# Patient Record
Sex: MALE | Race: WHITE | NOT HISPANIC OR LATINO | URBAN - METROPOLITAN AREA
[De-identification: names, ages, dates, MRNs, and addresses within clinical notes are randomized per-mention and may not be internally consistent; named-entity substitution may affect disease eponyms.]

---

## 2017-08-22 ENCOUNTER — OUTPATIENT (OUTPATIENT)
Dept: OUTPATIENT SERVICES | Facility: HOSPITAL | Age: 77
LOS: 1 days | Discharge: HOME | End: 2017-08-22

## 2017-08-24 DIAGNOSIS — H35.351 CYSTOID MACULAR DEGENERATION, RIGHT EYE: ICD-10-CM

## 2017-08-24 DIAGNOSIS — H31.093 OTHER CHORIORETINAL SCARS, BILATERAL: ICD-10-CM

## 2017-08-24 DIAGNOSIS — H02.831 DERMATOCHALASIS OF RIGHT UPPER EYELID: ICD-10-CM

## 2017-08-24 DIAGNOSIS — H16.223 KERATOCONJUNCTIVITIS SICCA, NOT SPECIFIED AS SJOGREN'S, BILATERAL: ICD-10-CM

## 2017-08-24 DIAGNOSIS — H02.834 DERMATOCHALASIS OF LEFT UPPER EYELID: ICD-10-CM

## 2017-08-24 PROBLEM — Z00.00 ENCOUNTER FOR PREVENTIVE HEALTH EXAMINATION: Status: ACTIVE | Noted: 2017-08-24

## 2018-04-03 ENCOUNTER — OUTPATIENT (OUTPATIENT)
Dept: OUTPATIENT SERVICES | Facility: HOSPITAL | Age: 78
LOS: 1 days | Discharge: HOME | End: 2018-04-03

## 2018-04-17 DIAGNOSIS — T81.31XD DISRUPTION OF EXTERNAL OPERATION (SURGICAL) WOUND, NOT ELSEWHERE CLASSIFIED, SUBSEQUENT ENCOUNTER: ICD-10-CM

## 2018-04-17 DIAGNOSIS — H02.832 DERMATOCHALASIS OF RIGHT LOWER EYELID: ICD-10-CM

## 2018-04-17 DIAGNOSIS — H16.223 KERATOCONJUNCTIVITIS SICCA, NOT SPECIFIED AS SJOGREN'S, BILATERAL: ICD-10-CM

## 2018-04-17 DIAGNOSIS — D31.42 BENIGN NEOPLASM OF LEFT CILIARY BODY: ICD-10-CM

## 2018-04-17 DIAGNOSIS — H35.3131 NONEXUDATIVE AGE-RELATED MACULAR DEGENERATION, BILATERAL, EARLY DRY STAGE: ICD-10-CM

## 2018-04-17 DIAGNOSIS — L85.8 OTHER SPECIFIED EPIDERMAL THICKENING: ICD-10-CM

## 2018-04-17 DIAGNOSIS — H02.835 DERMATOCHALASIS OF LEFT LOWER EYELID: ICD-10-CM

## 2019-05-23 ENCOUNTER — OUTPATIENT (OUTPATIENT)
Dept: OUTPATIENT SERVICES | Facility: HOSPITAL | Age: 79
LOS: 1 days | Discharge: HOME | End: 2019-05-23
Payer: MEDICARE

## 2019-05-23 DIAGNOSIS — R07.9 CHEST PAIN, UNSPECIFIED: ICD-10-CM

## 2019-05-23 PROCEDURE — 75574 CT ANGIO HRT W/3D IMAGE: CPT | Mod: 26

## 2019-06-26 ENCOUNTER — OUTPATIENT (OUTPATIENT)
Dept: OUTPATIENT SERVICES | Facility: HOSPITAL | Age: 79
LOS: 1 days | Discharge: HOME | End: 2019-06-26

## 2019-06-26 LAB
HCT VFR BLD CALC: 47.8 % — SIGNIFICANT CHANGE UP (ref 42–52)
HGB BLD-MCNC: 16.2 G/DL — SIGNIFICANT CHANGE UP (ref 14–18)
MCHC RBC-ENTMCNC: 31.8 PG — HIGH (ref 27–31)
MCHC RBC-ENTMCNC: 33.9 G/DL — SIGNIFICANT CHANGE UP (ref 32–37)
MCV RBC AUTO: 93.7 FL — SIGNIFICANT CHANGE UP (ref 80–94)
NRBC # BLD: 0 /100 WBCS — SIGNIFICANT CHANGE UP (ref 0–0)
PLATELET # BLD AUTO: 160 K/UL — SIGNIFICANT CHANGE UP (ref 130–400)
RBC # BLD: 5.1 M/UL — SIGNIFICANT CHANGE UP (ref 4.7–6.1)
RBC # FLD: 13.2 % — SIGNIFICANT CHANGE UP (ref 11.5–14.5)
WBC # BLD: 6.57 K/UL — SIGNIFICANT CHANGE UP (ref 4.8–10.8)
WBC # FLD AUTO: 6.57 K/UL — SIGNIFICANT CHANGE UP (ref 4.8–10.8)

## 2019-06-26 RX ORDER — ASPIRIN/CALCIUM CARB/MAGNESIUM 324 MG
1 TABLET ORAL
Qty: 30 | Refills: 0
Start: 2019-06-26 | End: 2019-07-25

## 2019-06-26 RX ORDER — FINASTERIDE 5 MG/1
1 TABLET, FILM COATED ORAL
Qty: 0 | Refills: 0 | DISCHARGE

## 2019-06-26 RX ORDER — METOPROLOL TARTRATE 50 MG
1 TABLET ORAL
Qty: 30 | Refills: 0
Start: 2019-06-26 | End: 2019-07-25

## 2019-06-26 RX ORDER — CLOPIDOGREL BISULFATE 75 MG/1
1 TABLET, FILM COATED ORAL
Qty: 30 | Refills: 0
Start: 2019-06-26 | End: 2019-07-25

## 2019-06-26 RX ORDER — METOPROLOL TARTRATE 50 MG
1 TABLET ORAL
Qty: 0 | Refills: 0 | DISCHARGE

## 2019-06-26 RX ORDER — DUTASTERIDE 0.5 MG/1
1 CAPSULE, LIQUID FILLED ORAL
Qty: 30 | Refills: 0
Start: 2019-06-26 | End: 2019-07-25

## 2019-06-26 NOTE — PROGRESS NOTE ADULT - SUBJECTIVE AND OBJECTIVE BOX
Cardiology Follow up    GROVER BEJARANO   79y Male  PAST MEDICAL & SURGICAL HISTORY:  Hyperlipidemia  GERD (gastroesophageal reflux disease)     HPI:  79 yrs old with Hx of BPH presented for elective LHC.  pt had a CCTA as out pt showing stenosis in mid LAD. (26 Jun 2019 10:11)    Allergies    penicillin (Rash)    Patient without complaints. Pt ambulated without issues/symptoms  Denies CP, SOB, palpitations, or dizziness    HR: 63  BP: 130/67	  RR: 14  SpO2: 98 % on RA    REVIEW OF SYSTEMS:          CONSTITUTIONAL: No weakness, fevers or chills          EYES/ENT: No visual changes;  No vertigo or throat pain           NECK: No pain or stiffness          RESPIRATORY: No cough, wheezing, hemoptysis          CARDIOVASCULAR: no pain, no AMADOR, no palpitations           GASTROINTESTINAL: No abdominal or epigastric pain. No nausea, vomiting, or hematemesis;           GENITOURINARY: No dysuria, frequency or hematuria          NEUROLOGICAL: No numbness or weakness          SKIN: No itching, rashes    PHYSICAL EXAM:           CONSTITUTIONAL: Well-developed; well-nourished; in no acute distress  	SKIN: warm, dry  	HEAD: Normocephalic; atraumatic  	EYES: PERRL.  	ENT: No nasal discharge, airway clear, mucous membranes moist  	NECK: Supple; non tender.  	CARD: +S1, +S2, no murmurs, gallops, or rubs. Regular rate and rhythm    	RESP: No wheezes, rales or rhonchi. CTA B/L  	ABD: soft ntnd, + BS x 4 quadrants  	EXT: moves all extremities,  no clubbing, cyanosis or edema  	NEURO: Alert and oriented x3, no focal deficits          PSYCH: Cooperative, appropriate          VASCULAR:  +2 Rad / +2PTs / + 2DPs          EXTREMITY:              Right Radial: Dressing D/C/I, access site soft, no hematoma, no pain, + pulses, no numbness            · Note Type	Event Note  brief cath report	      PRE-OP DIAGNOSIS: abnormal CCTA    PROCEDURE: [facility] with coronary angiography    Physician: Dr Davidson/Dr Almaraz  Assistant: Rito    ANESTHESIA TYPE:  [  ]General Anesthesia  [ x ] Sedation  [  ] Local/Regional    ESTIMATED BLOOD LOSS:    10   mL    CONDITION  [  ] Critical  [  ] Serious  [  ]Fair  [x  ]Good      IV CONTRAST:  150     mL      FINDINGS    Left Heart Catheterization:  LVEF%: 55%  LVEDP:  [ ] Normal Coronary Arteries  [ ] Luminal Irregularities  [ ] Non-obstructive CAD    ACCESS:    [x ] right radial artery  [ ] right femoral artery    LEFT HEART CATHETERIZATION                                    Left main no disease  LAD:  100% chronic total occlusion in mid LAD , with bridging collaterals                      Left Circumflex: 50% lesion in distal Circ  Right Coronary Artery: 30% lesion in mid RCA      POST-OP DIAGNOSIS  CAD , with  in mid LAD.      PLAN OF CARE  [x ] D/C Home today, pt will be brought back for the mid LAD  in 2 to 4 weeks with femoral access              pt was loaded with 300mg of plavix , continue with asa 81mg and plavix 75mg daily in addition to statin and BB.      Electronic Signatures:  Shantelle Veras)  (Signed 26-Jun-2019 12:08)  	Authored: Note Type, Note  Eugene Botello)  (Signature Pending)  	Co-Signer: Note Type, Note    ECG:   NSR       LABS:                        16.2   6.57  )-----------( 160      ( 26 Jun 2019 09:45 )             47.8     A/P:  I discussed the case with Cardiologist Dr. Botello and recommend the following:    S/P PCI:   	     Start DAPT ( Aspirin 81 mg PO Daily, Plavix 75 mg PO daily ), B-Blocker, Statin Therapy                   Patient given 30 day supply of ( Aspirin 81 mg daily and Plavix 75 mg daily ) to take at home                   Patient agreeing to take DAPT for at least one year or as directed by cardiologist                    Pt given instructions on importance of taking antiplatelet medication or risk acute stent thrombosis/death                   Post cath instructions, access site care and activity restrictions reviewed with patient                     Discussed with patient to return to hospital if experience chest pain, shortness breath, dizziness and site bleeding                   Aggressive risk factor modification, diet counseling, smoking cessation discussed with patient                       Can discharge patient from cardiac standpoint after ambulating without symptoms and access site wnl                    Follow up with Cardiology Dr. Botello in one week.  Instructed to call and make an appointment Cardiology Follow up    GROVER BEJARANO   79y Male  PAST MEDICAL & SURGICAL HISTORY:  Hyperlipidemia  GERD (gastroesophageal reflux disease)     HPI:  79 yrs old with Hx of BPH presented for elective LHC.  pt had a CCTA as out pt showing stenosis in mid LAD. (26 Jun 2019 10:11)    Allergies    penicillin (Rash)    Patient without complaints. Pt ambulated without issues/symptoms  Denies CP, SOB, palpitations, or dizziness    HR: 63  BP: 130/67	  RR: 14  SpO2: 98 % on RA    REVIEW OF SYSTEMS:          CONSTITUTIONAL: No weakness, fevers or chills          EYES/ENT: No visual changes;  No vertigo or throat pain           NECK: No pain or stiffness          RESPIRATORY: No cough, wheezing, hemoptysis          CARDIOVASCULAR: no pain, no AMADOR, no palpitations           GASTROINTESTINAL: No abdominal or epigastric pain. No nausea, vomiting, or hematemesis;           GENITOURINARY: No dysuria, frequency or hematuria          NEUROLOGICAL: No numbness or weakness          SKIN: No itching, rashes    PHYSICAL EXAM:           CONSTITUTIONAL: Well-developed; well-nourished; in no acute distress  	SKIN: warm, dry  	HEAD: Normocephalic; atraumatic  	EYES: PERRL.  	ENT: No nasal discharge, airway clear, mucous membranes moist  	NECK: Supple; non tender.  	CARD: +S1, +S2, no murmurs, gallops, or rubs. Regular rate and rhythm    	RESP: No wheezes, rales or rhonchi. CTA B/L  	ABD: soft ntnd, + BS x 4 quadrants  	EXT: moves all extremities,  no clubbing, cyanosis or edema  	NEURO: Alert and oriented x3, no focal deficits          PSYCH: Cooperative, appropriate          VASCULAR:  +2 Rad / +2PTs / + 2DPs          EXTREMITY:              Right Radial: Dressing D/C/I, access site soft, no hematoma, no pain, + pulses, no numbness            · Note Type	Event Note  brief cath report	      PRE-OP DIAGNOSIS: abnormal CCTA    PROCEDURE: [facility] with coronary angiography    Physician: Dr Davidson/Dr Almaraz  Assistant: Rito    ANESTHESIA TYPE:  [  ]General Anesthesia  [ x ] Sedation  [  ] Local/Regional    ESTIMATED BLOOD LOSS:    10   mL    CONDITION  [  ] Critical  [  ] Serious  [  ]Fair  [x  ]Good      IV CONTRAST:  150     mL      FINDINGS    Left Heart Catheterization:  LVEF%: 55%  LVEDP:  [ ] Normal Coronary Arteries  [ ] Luminal Irregularities  [ ] Non-obstructive CAD    ACCESS:    [x ] right radial artery  [ ] right femoral artery    LEFT HEART CATHETERIZATION                                    Left main no disease  LAD:  100% chronic total occlusion in mid LAD , with bridging collaterals                      Left Circumflex: 50% lesion in distal Circ  Right Coronary Artery: 30% lesion in mid RCA      POST-OP DIAGNOSIS  CAD , with  in mid LAD.      PLAN OF CARE  [x ] D/C Home today, pt will be brought back for the mid LAD  in 2 to 4 weeks with femoral access              pt was loaded with 300mg of plavix , continue with asa 81mg and plavix 75mg daily in addition to statin and BB.      Electronic Signatures:  Shantelle Veras)  (Signed 26-Jun-2019 12:08)  	Authored: Note Type, Note  Eugene Botello)  (Signature Pending)  	Co-Signer: Note Type, Note    ECG:   NSR       LABS:                        16.2   6.57  )-----------( 160      ( 26 Jun 2019 09:45 )             47.8     A/P:  I discussed the case with Cardiologist Dr. Botello and recommend the following:     s/p Cath: mid LAD     	     Start DAPT ( Aspirin 81 mg PO Daily, Plavix 75 mg PO daily ), B-Blocker, Statin Therapy                   Patient given 30 day supply of ( Aspirin 81 mg daily and Plavix 75 mg daily ) to take at home                   Patient agreeing to take DAPT for at least one year or as directed by cardiologist                    Pt given instructions on importance of taking antiplatelet medication or risk acute stent thrombosis/death                   Post cath instructions, access site care and activity restrictions reviewed with patient                     Discussed with patient to return to hospital if experience chest pain, shortness breath, dizziness and site bleeding                   Aggressive risk factor modification, diet counseling, smoking cessation discussed with patient                       Can discharge patient from cardiac standpoint after ambulating without symptoms and access site wnl                    Follow up with Cardiology Dr. Botello in one week.  Instructed to call and make an appointment

## 2019-06-26 NOTE — CHART NOTE - NSCHARTNOTEFT_GEN_A_CORE
PRE-OP DIAGNOSIS: abnormal CCTA    PROCEDURE: [facility] with coronary angiography    Physician: Dr Davidson/Dr Almaraz  Assistant: Rito    ANESTHESIA TYPE:  [  ]General Anesthesia  [ x ] Sedation  [  ] Local/Regional    ESTIMATED BLOOD LOSS:    10   mL    CONDITION  [  ] Critical  [  ] Serious  [  ]Fair  [x  ]Good      IV CONTRAST:  150     mL      FINDINGS    Left Heart Catheterization:  LVEF%: 55%  LVEDP:  [ ] Normal Coronary Arteries  [ ] Luminal Irregularities  [ ] Non-obstructive CAD    ACCESS:    [x ] right radial artery  [ ] right femoral artery    LEFT HEART CATHETERIZATION                                    Left main no disease  LAD:  100% chronic total occlusion in mid LAD , with bridging collaterals                      Left Circumflex: 50% lesion in distal Circ  Right Coronary Artery: 30% lesion in mid RCA      POST-OP DIAGNOSIS  CAD , with  in mid LAD.      PLAN OF CARE  [x ] D/C Home today, pt will be brought back for the mid LAD  in 2 to 4 weeks with femoral access              pt was loaded with 300mg of plavix , continue with asa 81mg nad plavix 75mg daily in addition to statin and BB

## 2019-06-26 NOTE — H&P CARDIOLOGY - HISTORY OF PRESENT ILLNESS
79 yrs old with Hx of BPH presented for elective LHC.  pt had a CCTA as out pt showing stenosis in mid LAD.

## 2019-07-05 DIAGNOSIS — K21.9 GASTRO-ESOPHAGEAL REFLUX DISEASE WITHOUT ESOPHAGITIS: ICD-10-CM

## 2019-07-05 DIAGNOSIS — E78.00 PURE HYPERCHOLESTEROLEMIA, UNSPECIFIED: ICD-10-CM

## 2019-07-05 DIAGNOSIS — I25.118 ATHEROSCLEROTIC HEART DISEASE OF NATIVE CORONARY ARTERY WITH OTHER FORMS OF ANGINA PECTORIS: ICD-10-CM

## 2019-07-05 DIAGNOSIS — Z88.0 ALLERGY STATUS TO PENICILLIN: ICD-10-CM

## 2019-07-05 DIAGNOSIS — I20.8 OTHER FORMS OF ANGINA PECTORIS: ICD-10-CM

## 2019-07-16 PROBLEM — K21.9 GASTRO-ESOPHAGEAL REFLUX DISEASE WITHOUT ESOPHAGITIS: Chronic | Status: ACTIVE | Noted: 2019-06-26

## 2019-07-16 PROBLEM — E78.5 HYPERLIPIDEMIA, UNSPECIFIED: Chronic | Status: ACTIVE | Noted: 2019-06-26

## 2019-07-25 ENCOUNTER — OUTPATIENT (OUTPATIENT)
Dept: OUTPATIENT SERVICES | Facility: HOSPITAL | Age: 79
LOS: 1 days | Discharge: HOME | End: 2019-07-25
Payer: MEDICARE

## 2019-07-25 LAB
HCT VFR BLD CALC: 44.9 % — SIGNIFICANT CHANGE UP (ref 42–52)
HCT VFR BLD CALC: 45.7 % — SIGNIFICANT CHANGE UP (ref 42–52)
HGB BLD-MCNC: 15.1 G/DL — SIGNIFICANT CHANGE UP (ref 14–18)
HGB BLD-MCNC: 15.3 G/DL — SIGNIFICANT CHANGE UP (ref 14–18)
MCHC RBC-ENTMCNC: 31 PG — SIGNIFICANT CHANGE UP (ref 27–31)
MCHC RBC-ENTMCNC: 31.5 PG — HIGH (ref 27–31)
MCHC RBC-ENTMCNC: 33.5 G/DL — SIGNIFICANT CHANGE UP (ref 32–37)
MCHC RBC-ENTMCNC: 33.6 G/DL — SIGNIFICANT CHANGE UP (ref 32–37)
MCV RBC AUTO: 92.7 FL — SIGNIFICANT CHANGE UP (ref 80–94)
MCV RBC AUTO: 93.5 FL — SIGNIFICANT CHANGE UP (ref 80–94)
NRBC # BLD: 0 /100 WBCS — SIGNIFICANT CHANGE UP (ref 0–0)
NRBC # BLD: 0 /100 WBCS — SIGNIFICANT CHANGE UP (ref 0–0)
PLATELET # BLD AUTO: 164 K/UL — SIGNIFICANT CHANGE UP (ref 130–400)
PLATELET # BLD AUTO: 168 K/UL — SIGNIFICANT CHANGE UP (ref 130–400)
RBC # BLD: 4.8 M/UL — SIGNIFICANT CHANGE UP (ref 4.7–6.1)
RBC # BLD: 4.93 M/UL — SIGNIFICANT CHANGE UP (ref 4.7–6.1)
RBC # FLD: 12.9 % — SIGNIFICANT CHANGE UP (ref 11.5–14.5)
RBC # FLD: 13 % — SIGNIFICANT CHANGE UP (ref 11.5–14.5)
WBC # BLD: 5.8 K/UL — SIGNIFICANT CHANGE UP (ref 4.8–10.8)
WBC # BLD: 7.27 K/UL — SIGNIFICANT CHANGE UP (ref 4.8–10.8)
WBC # FLD AUTO: 5.8 K/UL — SIGNIFICANT CHANGE UP (ref 4.8–10.8)
WBC # FLD AUTO: 7.27 K/UL — SIGNIFICANT CHANGE UP (ref 4.8–10.8)

## 2019-07-25 PROCEDURE — 92943 PRQ TRLUML REVSC CH OCC ANT: CPT | Mod: LD,53

## 2019-07-25 PROCEDURE — 93454 CORONARY ARTERY ANGIO S&I: CPT | Mod: 26,XU

## 2019-07-25 RX ORDER — FINASTERIDE 5 MG/1
1 TABLET, FILM COATED ORAL
Qty: 0 | Refills: 0 | DISCHARGE

## 2019-07-25 RX ORDER — TAMSULOSIN HYDROCHLORIDE 0.4 MG/1
1 CAPSULE ORAL
Qty: 0 | Refills: 0 | DISCHARGE

## 2019-07-25 RX ORDER — FAMOTIDINE 10 MG/ML
1 INJECTION INTRAVENOUS
Qty: 0 | Refills: 0 | DISCHARGE

## 2019-07-25 RX ORDER — ATORVASTATIN CALCIUM 80 MG/1
1 TABLET, FILM COATED ORAL
Qty: 0 | Refills: 0 | DISCHARGE

## 2019-07-25 NOTE — CHART NOTE - NSCHARTNOTEFT_GEN_A_CORE
PRE-OP DIAGNOSIS: CAD    PROCEDURE: Coronary angiography    Physician: DARIEN Potter   Assistant: Yonis Heredia    ANESTHESIA TYPE:  [ x ] Sedation  [ x ] Local/Regional    ESTIMATED BLOOD LOSS:    10   mL    CONDITION  [ x ]Good    IV CONTRAST:    250      mL    FINDINGS    Left Heart Catheterization:    1 V CAD  Right radial 5 Fr  Right femoral 7 Fr  Closure with Perclose  Radial D stat  Right dominant                              Left main Normal  LAD:   mid LAD                      Diag:     Left Circumflex: Normal  OM: Normal  Right Coronary Artery: Mild ds, normal    INTERVENTION  Attemptefd  PCI of mid LAD      POST-OP DIAGNOSIS  1 v CAD  mid LAD    PLAN OF CARE  [x ] D/C Home today  c/w medical therapy PRE-OP DIAGNOSIS: CAD    PROCEDURE: Coronary angiography    Physician: DARIEN Potter   Assistant: Yonis Heredia    ANESTHESIA TYPE:  [ x ] Sedation  [ x ] Local/Regional    ESTIMATED BLOOD LOSS:    10   mL    CONDITION  [ x ]Good    IV CONTRAST:    250      mL    FINDINGS    Left Heart Catheterization:    1 V CAD  Right radial 5 Fr  Right femoral 7 Fr  Closure with Perclose  Radial D stat  Right dominant                              Left main Normal  LAD:   mid LAD                      Diag:     Left Circumflex: Normal  OM: Normal  Right Coronary Artery: Mild ds, normal    INTERVENTION  Attempted  PCI of mid LAD      POST-OP DIAGNOSIS  1 v CAD  mid LAD    PLAN OF CARE  [x ] D/C Home today  c/w medical therapy

## 2019-07-25 NOTE — H&P CARDIOLOGY - HISTORY OF PRESENT ILLNESS
79 male with CAD (mid-LAD stenosis on cath in june 2019) presenting for [facility] with intervention to  of mid-LAD. Denies chest pain, AMADOR or dizziness. Took his AM ASA and plavix. 79 male with CAD (mid-LAD stenosis on cath in june 2019) presenting for [facility] with intervention to  of mid-LAD. Took his AM ASA and plavix.

## 2019-08-02 DIAGNOSIS — E78.5 HYPERLIPIDEMIA, UNSPECIFIED: ICD-10-CM

## 2019-08-02 DIAGNOSIS — I10 ESSENTIAL (PRIMARY) HYPERTENSION: ICD-10-CM

## 2019-08-02 DIAGNOSIS — Z87.891 PERSONAL HISTORY OF NICOTINE DEPENDENCE: ICD-10-CM

## 2019-08-02 DIAGNOSIS — Z88.8 ALLERGY STATUS TO OTHER DRUGS, MEDICAMENTS AND BIOLOGICAL SUBSTANCES: ICD-10-CM

## 2019-08-02 DIAGNOSIS — I25.110 ATHEROSCLEROTIC HEART DISEASE OF NATIVE CORONARY ARTERY WITH UNSTABLE ANGINA PECTORIS: ICD-10-CM

## 2019-08-02 DIAGNOSIS — Z79.02 LONG TERM (CURRENT) USE OF ANTITHROMBOTICS/ANTIPLATELETS: ICD-10-CM

## 2019-08-02 DIAGNOSIS — I25.82 CHRONIC TOTAL OCCLUSION OF CORONARY ARTERY: ICD-10-CM

## 2019-08-02 DIAGNOSIS — I25.10 ATHEROSCLEROTIC HEART DISEASE OF NATIVE CORONARY ARTERY WITHOUT ANGINA PECTORIS: ICD-10-CM

## 2019-08-02 DIAGNOSIS — Z88.0 ALLERGY STATUS TO PENICILLIN: ICD-10-CM

## 2019-08-02 DIAGNOSIS — Z79.82 LONG TERM (CURRENT) USE OF ASPIRIN: ICD-10-CM

## 2019-08-13 ENCOUNTER — OUTPATIENT (OUTPATIENT)
Dept: OUTPATIENT SERVICES | Facility: HOSPITAL | Age: 79
LOS: 1 days | Discharge: HOME | End: 2019-08-13

## 2019-08-13 DIAGNOSIS — R68.89 OTHER GENERAL SYMPTOMS AND SIGNS: ICD-10-CM

## 2019-08-13 DIAGNOSIS — Z02.89 ENCOUNTER FOR OTHER ADMINISTRATIVE EXAMINATIONS: ICD-10-CM

## 2019-08-13 DIAGNOSIS — N25.9 DISORDER RESULTING FROM IMPAIRED RENAL TUBULAR FUNCTION, UNSPECIFIED: ICD-10-CM

## 2019-08-13 DIAGNOSIS — R79.1 ABNORMAL COAGULATION PROFILE: ICD-10-CM

## 2019-08-13 DIAGNOSIS — Z79.01 LONG TERM (CURRENT) USE OF ANTICOAGULANTS: ICD-10-CM

## 2022-09-27 ENCOUNTER — APPOINTMENT (OUTPATIENT)
Dept: ORTHOPEDIC SURGERY | Facility: CLINIC | Age: 82
End: 2022-09-27

## 2022-09-27 DIAGNOSIS — M25.511 PAIN IN RIGHT SHOULDER: ICD-10-CM

## 2022-09-27 PROCEDURE — 99212 OFFICE O/P EST SF 10 MIN: CPT | Mod: 25

## 2022-09-27 PROCEDURE — 20610 DRAIN/INJ JOINT/BURSA W/O US: CPT | Mod: RT

## 2022-09-27 NOTE — DISCUSSION/SUMMARY
[de-identified] :   Impression: Right shoulder pain.  \par \par Plan:  Patient was over a cortisone injection, I agree, patient was advised the cortisone injection only be given 3 times a year.  \par Patient agrees and understands.  \par Follow-up as needed.\par \par Supervising physician Dr. Albarado.\par

## 2022-09-27 NOTE — IMAGING
[de-identified] :   Patient has good range of motion to the right shoulder with end range pain, patient has difficulty with overhead activities due to pain and weakness.

## 2022-09-27 NOTE — PROCEDURE
[Large Joint Injection] : Large joint injection [Right] : of the right [Shoulder] : shoulder [___ cc    0.25%] : Bupivacaine (Marcaine) ~Vcc of 0.25%  [___ cc    4mg] : Dexamethasone (Decadron) ~Vcc of 4 mg  [] : Patient tolerated procedure well

## 2023-12-31 ENCOUNTER — NON-APPOINTMENT (OUTPATIENT)
Age: 83
End: 2023-12-31

## 2024-05-24 ENCOUNTER — EMERGENCY (EMERGENCY)
Facility: HOSPITAL | Age: 84
LOS: 0 days | Discharge: HOME | End: 2024-05-25
Attending: EMERGENCY MEDICINE
Payer: MEDICARE

## 2024-05-24 VITALS
DIASTOLIC BLOOD PRESSURE: 65 MMHG | OXYGEN SATURATION: 99 % | HEART RATE: 65 BPM | WEIGHT: 154.98 LBS | TEMPERATURE: 98 F | RESPIRATION RATE: 16 BRPM | SYSTOLIC BLOOD PRESSURE: 134 MMHG

## 2024-05-24 DIAGNOSIS — R93.5 ABNORMAL FINDINGS ON DIAGNOSTIC IMAGING OF OTHER ABDOMINAL REGIONS, INCLUDING RETROPERITONEUM: ICD-10-CM

## 2024-05-24 DIAGNOSIS — I48.91 UNSPECIFIED ATRIAL FIBRILLATION: ICD-10-CM

## 2024-05-24 DIAGNOSIS — R11.0 NAUSEA: ICD-10-CM

## 2024-05-24 DIAGNOSIS — R42 DIZZINESS AND GIDDINESS: ICD-10-CM

## 2024-05-24 DIAGNOSIS — Z79.01 LONG TERM (CURRENT) USE OF ANTICOAGULANTS: ICD-10-CM

## 2024-05-24 DIAGNOSIS — R00.1 BRADYCARDIA, UNSPECIFIED: ICD-10-CM

## 2024-05-24 DIAGNOSIS — R31.9 HEMATURIA, UNSPECIFIED: ICD-10-CM

## 2024-05-24 DIAGNOSIS — I25.10 ATHEROSCLEROTIC HEART DISEASE OF NATIVE CORONARY ARTERY WITHOUT ANGINA PECTORIS: ICD-10-CM

## 2024-05-24 DIAGNOSIS — R94.31 ABNORMAL ELECTROCARDIOGRAM [ECG] [EKG]: ICD-10-CM

## 2024-05-24 DIAGNOSIS — R26.81 UNSTEADINESS ON FEET: ICD-10-CM

## 2024-05-24 DIAGNOSIS — Z95.5 PRESENCE OF CORONARY ANGIOPLASTY IMPLANT AND GRAFT: ICD-10-CM

## 2024-05-24 LAB
ALBUMIN SERPL ELPH-MCNC: 4.2 G/DL — SIGNIFICANT CHANGE UP (ref 3.5–5.2)
ALP SERPL-CCNC: 74 U/L — SIGNIFICANT CHANGE UP (ref 30–115)
ALT FLD-CCNC: 10 U/L — SIGNIFICANT CHANGE UP (ref 0–41)
ANION GAP SERPL CALC-SCNC: 12 MMOL/L — SIGNIFICANT CHANGE UP (ref 7–14)
AST SERPL-CCNC: 17 U/L — SIGNIFICANT CHANGE UP (ref 0–41)
BASOPHILS # BLD AUTO: 0.03 K/UL — SIGNIFICANT CHANGE UP (ref 0–0.2)
BASOPHILS NFR BLD AUTO: 0.4 % — SIGNIFICANT CHANGE UP (ref 0–1)
BILIRUB SERPL-MCNC: 0.4 MG/DL — SIGNIFICANT CHANGE UP (ref 0.2–1.2)
BUN SERPL-MCNC: 19 MG/DL — SIGNIFICANT CHANGE UP (ref 10–20)
CALCIUM SERPL-MCNC: 9.4 MG/DL — SIGNIFICANT CHANGE UP (ref 8.4–10.5)
CHLORIDE SERPL-SCNC: 103 MMOL/L — SIGNIFICANT CHANGE UP (ref 98–110)
CO2 SERPL-SCNC: 25 MMOL/L — SIGNIFICANT CHANGE UP (ref 17–32)
CREAT SERPL-MCNC: 1 MG/DL — SIGNIFICANT CHANGE UP (ref 0.7–1.5)
EGFR: 74 ML/MIN/1.73M2 — SIGNIFICANT CHANGE UP
EOSINOPHIL # BLD AUTO: 0.03 K/UL — SIGNIFICANT CHANGE UP (ref 0–0.7)
EOSINOPHIL NFR BLD AUTO: 0.4 % — SIGNIFICANT CHANGE UP (ref 0–8)
GLUCOSE SERPL-MCNC: 134 MG/DL — HIGH (ref 70–99)
HCT VFR BLD CALC: 46.5 % — SIGNIFICANT CHANGE UP (ref 42–52)
HGB BLD-MCNC: 15.4 G/DL — SIGNIFICANT CHANGE UP (ref 14–18)
IMM GRANULOCYTES NFR BLD AUTO: 0.5 % — HIGH (ref 0.1–0.3)
LYMPHOCYTES # BLD AUTO: 0.72 K/UL — LOW (ref 1.2–3.4)
LYMPHOCYTES # BLD AUTO: 9 % — LOW (ref 20.5–51.1)
MCHC RBC-ENTMCNC: 31.4 PG — HIGH (ref 27–31)
MCHC RBC-ENTMCNC: 33.1 G/DL — SIGNIFICANT CHANGE UP (ref 32–37)
MCV RBC AUTO: 94.7 FL — HIGH (ref 80–94)
MONOCYTES # BLD AUTO: 0.25 K/UL — SIGNIFICANT CHANGE UP (ref 0.1–0.6)
MONOCYTES NFR BLD AUTO: 3.1 % — SIGNIFICANT CHANGE UP (ref 1.7–9.3)
NEUTROPHILS # BLD AUTO: 6.92 K/UL — HIGH (ref 1.4–6.5)
NEUTROPHILS NFR BLD AUTO: 86.6 % — HIGH (ref 42.2–75.2)
NRBC # BLD: 0 /100 WBCS — SIGNIFICANT CHANGE UP (ref 0–0)
PLATELET # BLD AUTO: 178 K/UL — SIGNIFICANT CHANGE UP (ref 130–400)
PMV BLD: 11.2 FL — HIGH (ref 7.4–10.4)
POTASSIUM SERPL-MCNC: 4.3 MMOL/L — SIGNIFICANT CHANGE UP (ref 3.5–5)
POTASSIUM SERPL-SCNC: 4.3 MMOL/L — SIGNIFICANT CHANGE UP (ref 3.5–5)
PROT SERPL-MCNC: 6.9 G/DL — SIGNIFICANT CHANGE UP (ref 6–8)
RBC # BLD: 4.91 M/UL — SIGNIFICANT CHANGE UP (ref 4.7–6.1)
RBC # FLD: 13.2 % — SIGNIFICANT CHANGE UP (ref 11.5–14.5)
SODIUM SERPL-SCNC: 140 MMOL/L — SIGNIFICANT CHANGE UP (ref 135–146)
TROPONIN T, HIGH SENSITIVITY RESULT: 22 NG/L — HIGH (ref 6–21)
WBC # BLD: 7.99 K/UL — SIGNIFICANT CHANGE UP (ref 4.8–10.8)
WBC # FLD AUTO: 7.99 K/UL — SIGNIFICANT CHANGE UP (ref 4.8–10.8)

## 2024-05-24 PROCEDURE — 36415 COLL VENOUS BLD VENIPUNCTURE: CPT

## 2024-05-24 PROCEDURE — 71045 X-RAY EXAM CHEST 1 VIEW: CPT

## 2024-05-24 PROCEDURE — 70450 CT HEAD/BRAIN W/O DYE: CPT | Mod: 26,XU,MC

## 2024-05-24 PROCEDURE — 99285 EMERGENCY DEPT VISIT HI MDM: CPT | Mod: 25

## 2024-05-24 PROCEDURE — 99285 EMERGENCY DEPT VISIT HI MDM: CPT

## 2024-05-24 PROCEDURE — 74176 CT ABD & PELVIS W/O CONTRAST: CPT | Mod: MC

## 2024-05-24 PROCEDURE — 93010 ELECTROCARDIOGRAM REPORT: CPT

## 2024-05-24 PROCEDURE — 93306 TTE W/DOPPLER COMPLETE: CPT

## 2024-05-24 PROCEDURE — 80053 COMPREHEN METABOLIC PANEL: CPT

## 2024-05-24 PROCEDURE — 70498 CT ANGIOGRAPHY NECK: CPT | Mod: 26,MC

## 2024-05-24 PROCEDURE — 93005 ELECTROCARDIOGRAM TRACING: CPT

## 2024-05-24 PROCEDURE — 70496 CT ANGIOGRAPHY HEAD: CPT | Mod: MC

## 2024-05-24 PROCEDURE — 70496 CT ANGIOGRAPHY HEAD: CPT | Mod: 26,MC

## 2024-05-24 PROCEDURE — 84484 ASSAY OF TROPONIN QUANT: CPT

## 2024-05-24 PROCEDURE — 71045 X-RAY EXAM CHEST 1 VIEW: CPT | Mod: 26

## 2024-05-24 PROCEDURE — 85025 COMPLETE CBC W/AUTO DIFF WBC: CPT

## 2024-05-24 PROCEDURE — 96374 THER/PROPH/DIAG INJ IV PUSH: CPT | Mod: XU

## 2024-05-24 PROCEDURE — 70498 CT ANGIOGRAPHY NECK: CPT | Mod: MC

## 2024-05-24 PROCEDURE — G0378: CPT

## 2024-05-24 PROCEDURE — 81001 URINALYSIS AUTO W/SCOPE: CPT

## 2024-05-24 PROCEDURE — 70450 CT HEAD/BRAIN W/O DYE: CPT | Mod: MC

## 2024-05-24 RX ORDER — MECLIZINE HCL 12.5 MG
50 TABLET ORAL ONCE
Refills: 0 | Status: COMPLETED | OUTPATIENT
Start: 2024-05-24 | End: 2024-05-24

## 2024-05-24 RX ORDER — METOCLOPRAMIDE HCL 10 MG
10 TABLET ORAL ONCE
Refills: 0 | Status: COMPLETED | OUTPATIENT
Start: 2024-05-24 | End: 2024-05-24

## 2024-05-24 NOTE — ED ADULT NURSE NOTE - NS ED NURSE DC PT EDUCATION RESOURCES
Exercise for a Healthier Heart     Exercise with a friend. When activity is fun, you're more likely to stick with it.   You may wonder how you can improve the health of your heart. If you’re thinking about exercise, you’re on the right track. You don’t need to become an athlete. But you do need a certain amount of brisk exercise to help strengthen your heart. If you have been diagnosed with a heart condition, your healthcare provider may advise exercise to help stabilize your condition. To help make exercise a habit, choose safe, fun activities.   Before you start  Check with your healthcare provider before starting an exercise program. This is especially important if you have not been active for a while. It's also important if you have a long-term (chronic) health problem such as heart disease, diabetes, or obesity. Or if you are at high risk for having these problems.   Why exercise?  Exercising regularly offers many healthy rewards. It can help you do all of the following:  Improve your blood cholesterol level to help prevent further heart trouble  Lower your blood pressure to help prevent a stroke or heart attack  Control diabetes, or reduce your risk of getting this disease  Improve your heart and lung function  Reach and stay at a healthy weight  Make your muscles stronger so you can stay active  Prevent falls and fractures by slowing the loss of bone mass (osteoporosis)  Manage stress better  Reduce your blood pressure  Improve your sense of self and your body image  Exercise tips    Ease into your routine. Set small goals. Then build on them. If you are not sure what your activity level should be, talk with your healthcare provider first before starting an exercise routine.  Exercise on most days. Aim for a total of 150 minutes (2 hours and 30 minutes) or more of moderate-intensity aerobic activity each week. Or 75 minutes (1 hour and 15 minutes) or more of vigorous-intensity aerobic activity each week. Or  try for a combination of both. Moderate activity means that you breathe heavier and your heart rate increases but you can still talk. Think about doing 40 minutes of moderate exercise, 3 to 4 times a week. For best results, activity should last for about 40 minutes to lower blood pressure and cholesterol. It's OK to work up to the 40-minute period over time. Examples of moderate-intensity activity are walking 1 mile in 15 minutes. Or doing 30 to 45 minutes of yard work.  Step up your daily activity level.  Along with your exercise program, try being more active the whole day. Walk instead of drive. Or park further away so that you take more steps each day. Do more household tasks or yard work. You may not be able to meet the advised mount of physical activity. But doing some moderate- or vigorous-intensity aerobic activity can help reduce your risk for heart disease. Your healthcare provider can help you figure out what is best for you.  Choose 1 or more activities you enjoy.  Walking is one of the easiest things you can do. You can also try swimming, riding a bike, dancing, or taking an exercise class.    When to call your healthcare provider  Call your healthcare provider if you have any of these:   Chest pain or feel dizzy or lightheaded  Burning, tightness, pressure, or heaviness in your chest, neck, shoulders, back, or arms  Abnormal shortness of breath  More joint or muscle pain  A very fast or irregular heartbeat (palpitations)  Raj last reviewed this educational content on 7/1/2019  © 7426-9695 [employer], [employer]. [address] [place] [address], [place] [address]. All rights reserved. This information is not intended as a substitute for professional medical care. Always follow your healthcare professional's instructions.         Yes

## 2024-05-24 NOTE — ED ADULT NURSE NOTE - NSFALLHARMRISKINTERV_ED_ALL_ED

## 2024-05-24 NOTE — ED ADULT TRIAGE NOTE - CHIEF COMPLAINT QUOTE
pt BIBA for dizziness and nausea. pt was hit in head with garage door approximately 5 days ago. pt is on eliquis and plavix. pt denies any cp or sob. pt unsure if the symptoms started before or after the garage door stuck his head.

## 2024-05-25 ENCOUNTER — RESULT REVIEW (OUTPATIENT)
Age: 84
End: 2024-05-25

## 2024-05-25 VITALS
SYSTOLIC BLOOD PRESSURE: 127 MMHG | RESPIRATION RATE: 18 BRPM | OXYGEN SATURATION: 98 % | DIASTOLIC BLOOD PRESSURE: 72 MMHG | HEART RATE: 121 BPM | TEMPERATURE: 98 F

## 2024-05-25 LAB
APPEARANCE UR: CLEAR — SIGNIFICANT CHANGE UP
BACTERIA # UR AUTO: NEGATIVE /HPF — SIGNIFICANT CHANGE UP
BILIRUB UR-MCNC: NEGATIVE — SIGNIFICANT CHANGE UP
CAST: 0 /LPF — SIGNIFICANT CHANGE UP (ref 0–4)
COLOR SPEC: ABNORMAL
DIFF PNL FLD: ABNORMAL
GLUCOSE UR QL: NEGATIVE MG/DL — SIGNIFICANT CHANGE UP
KETONES UR-MCNC: ABNORMAL MG/DL
LEUKOCYTE ESTERASE UR-ACNC: NEGATIVE — SIGNIFICANT CHANGE UP
NITRITE UR-MCNC: NEGATIVE — SIGNIFICANT CHANGE UP
PH UR: 6.5 — SIGNIFICANT CHANGE UP (ref 5–8)
PROT UR-MCNC: SIGNIFICANT CHANGE UP MG/DL
RBC CASTS # UR COMP ASSIST: 785 /HPF — HIGH (ref 0–4)
SP GR SPEC: >1.03 — HIGH (ref 1–1.03)
SQUAMOUS # UR AUTO: 0 /HPF — SIGNIFICANT CHANGE UP (ref 0–5)
TROPONIN T, HIGH SENSITIVITY RESULT: 22 NG/L — HIGH (ref 6–21)
UROBILINOGEN FLD QL: 0.2 MG/DL — SIGNIFICANT CHANGE UP (ref 0.2–1)
WBC UR QL: 1 /HPF — SIGNIFICANT CHANGE UP (ref 0–5)

## 2024-05-25 PROCEDURE — 74176 CT ABD & PELVIS W/O CONTRAST: CPT | Mod: 26,MC

## 2024-05-25 PROCEDURE — 93306 TTE W/DOPPLER COMPLETE: CPT | Mod: 26

## 2024-05-25 PROCEDURE — 99236 HOSP IP/OBS SAME DATE HI 85: CPT

## 2024-05-25 RX ORDER — TAMSULOSIN HYDROCHLORIDE 0.4 MG/1
0.4 CAPSULE ORAL AT BEDTIME
Refills: 0 | Status: DISCONTINUED | OUTPATIENT
Start: 2024-05-25 | End: 2024-05-25

## 2024-05-25 RX ORDER — FAMOTIDINE 10 MG/ML
40 INJECTION INTRAVENOUS DAILY
Refills: 0 | Status: DISCONTINUED | OUTPATIENT
Start: 2024-05-25 | End: 2024-05-25

## 2024-05-25 RX ORDER — ATORVASTATIN CALCIUM 80 MG/1
20 TABLET, FILM COATED ORAL AT BEDTIME
Refills: 0 | Status: DISCONTINUED | OUTPATIENT
Start: 2024-05-25 | End: 2024-05-25

## 2024-05-25 RX ADMIN — ATORVASTATIN CALCIUM 20 MILLIGRAM(S): 80 TABLET, FILM COATED ORAL at 04:31

## 2024-05-25 RX ADMIN — TAMSULOSIN HYDROCHLORIDE 0.4 MILLIGRAM(S): 0.4 CAPSULE ORAL at 04:31

## 2024-05-25 RX ADMIN — Medication 50 MILLIGRAM(S): at 00:14

## 2024-05-25 RX ADMIN — Medication 10 MILLIGRAM(S): at 00:14

## 2024-05-25 NOTE — ED CDU PROVIDER DISPOSITION NOTE - NSFOLLOWUPCLINICS_GEN_ALL_ED_FT
[facility]  Neurology  [address] [place] [address], [address]  [place] [address]  Phone: [phone]  Fax:

## 2024-05-25 NOTE — ED CDU PROVIDER DISPOSITION NOTE - NSFOLLOWUPINSTRUCTIONS_ED_ALL_ED_FT
Please follow up with your primary care physician, neurologist and urologist.     Dizziness    Dizziness can manifest as a feeling of unsteadiness or light-headedness. You may feel like you are about to faint. This condition can be caused by a number of things, including medicines, dehydration, or illness. Drink enough fluid to keep your urine clear or pale yellow. Do not drink alcohol and limit your caffeine intake. Avoid quick or sudden movements.  Rise slowly from chairs and steady yourself until you feel okay. In the morning, first sit up on the side of the bed.    SEEK IMMEDIATE MEDICAL CARE IF YOU HAVE ANY OF THE FOLLOWING SYMPTOMS: vomiting, changes in your vision or speech, weakness in your arms or legs, trouble speaking or swallowing, chest pain, abdominal pain, shortness of breath, sweating, bleeding, headache, neck pain, or fever.

## 2024-05-25 NOTE — ED PROVIDER NOTE - ATTENDING APP SHARED VISIT CONTRIBUTION OF CARE
Patient presents to ED for evaluation of lightheadedness and unsteady gait. No trauma. Patient also noted blood in the urine. Patient is on anticoagulants.   Vitals reviewed.   Lungs; CTA,   Abd: +BS, NT, ND, soft,   CNS: Awake, alert, no focal neurologic deficits.  A/P: lightheadedness,   unsteady gait,   hematuria,   Labs, EKG,   imaging,   reevaluation.

## 2024-05-25 NOTE — ED PROVIDER NOTE - PHYSICAL EXAMINATION
VITAL SIGNS: I have reviewed nursing notes and confirm.  CONSTITUTIONAL: in no acute distress.  SKIN: Skin exam is warm and dry, no acute rash.  HEAD: Normocephalic; atraumatic.  EYES: PERRL, EOM intact; conjunctiva and sclera clear.  ENT: No nasal discharge; airway clear.   NECK: Supple; non tender.  CARD: S1, S2 normal; no murmurs, gallops, or rubs. Regular rate and rhythm.  RESP: No wheezes, rales or rhonchi. Speaking in full sentences.   ABD:  soft; non-distended; non-tender; No rebound or guarding. No CVA tenderness.  EXT: Normal ROM. No clubbing, cyanosis or edema.  NEURO: Alert, oriented. Grossly unremarkable. No focal deficits.

## 2024-05-25 NOTE — ED CDU PROVIDER DISPOSITION NOTE - CARE PROVIDER_API CALL
Bert Aleman  Urology  [address] [place] [address]  Phone: [phone]  Fax: [phone]  Follow Up Time:

## 2024-05-25 NOTE — ED CDU PROVIDER INITIAL DAY NOTE - CLINICAL SUMMARY MEDICAL DECISION MAKING FREE TEXT BOX
Patient presents to the emergency department with unsteadiness.  Also reporting some hematuria.  Labs and imaging done.  No acute findings.  Cardiac enzymes stable.  Incidental finding on CT abdomen of pancreatic cyst discussed and copy printed.  Patient placed in observation unit for further evaluation as well as echo.

## 2024-05-25 NOTE — ED PROVIDER NOTE - OBJECTIVE STATEMENT
84-year-old male presenting to ED for evaluation of dizziness and unsteady gait status post trauma to the head.  Patient states about 5 days ago he was walking when he got hit in head with garage door that was swung open by grandson.  Denies falling to the ground or hitting head but since then has had sensation of feeling unsteady and a few episodes of nausea vomiting denies CP, SOB, fevers, chills, blurry vision, neck pain, blood thinner use

## 2024-05-25 NOTE — ED CDU PROVIDER INITIAL DAY NOTE - OBJECTIVE STATEMENT
84 year old male, past medical history cad s/p 2 stents, afib on eliquis, who presents c/o "feeling off balance." patient reports intermittent episodes of positional lightheadedness and unsteady gait. patient also reports hitting head on door 5/12 resulting in small area of bruising. denies f/c, headache, vision changes, chest pain, shortness of breath, syncope.

## 2024-05-25 NOTE — ED CDU PROVIDER DISPOSITION NOTE - PATIENT PORTAL LINK FT
You can access the FollowMyHealth Patient Portal offered by [facility] by registering at the following website: [email or web address]. By joining [facility]’s FollowMyHealth portal, you will also be able to view your health information using other applications (apps) compatible with our system.

## 2024-05-25 NOTE — CONSULT NOTE ADULT - SUBJECTIVE AND OBJECTIVE BOX
NEUROLOGY CONSULT    HPI: Patient is a 85 y/o former smoker M with PMH of CAD who presents for the evaluation of episodes of feeling off balance. Patient states that on 05/12, he had accidently hit his head on the door and ended up with a small bruise and swelling. Patient denied any LOC and did not seek medical help. Patient states that he has been feeling off balance even before this head trauma episode.     MEDICATIONS  Home Medications:  famotidine 40 mg oral tablet: 1 tab(s) orally once a day (at bedtime) (25 Jul 2019 07:14)  finasteride 5 mg oral tablet: 1 tab(s) orally once a day (25 Jul 2019 07:14)  Flomax 0.4 mg oral capsule: 1 cap(s) orally once a day (25 Jul 2019 07:14)  Lipitor 20 mg oral tablet: 1 tab(s) orally once a day (25 Jul 2019 07:14)    MEDICATIONS  (STANDING):    MEDICATIONS  (PRN):      FAMILY HISTORY:  No pertinent family history in first degree relatives      SOCIAL HISTORY: negative for tobacco, alcohol, or ilicit drug use.    Allergies    penicillin (Rash)  Aleve (Hives)    Intolerances        GEN: NAD, pleasant, cooperative    NEURO:   MENTAL STATUS: AAOx3  LANG/SPEECH: Fluent, intact naming, repetition & comprehension  CRANIAL NERVES:  II: Pupils equal and reactive, no RAPD, normal visual field and fundus  III, IV, VI: EOM intact, no gaze preference or deviation  V: normal  VII: no facial asymmetry  VIII: normal hearing to speech  MOTOR: 5/5 in both upper and lower extremities  REFLEXES: 2/4 throughout, bilateral flexor plantars  SENSORY: Normal to touch, temperature & pin prick in all extremiteis  COORD: Normal finger to nose and heel to shin, no tremor, no dysmetria  Gait:   NIHSS: **** ASPECT Score: ***** ICH Score: ****** (GCS)    LABS:                        15.4   7.99  )-----------( 178      ( 24 May 2024 22:48 )             46.5     05-24    140  |  103  |  19  ----------------------------<  134<H>  4.3   |  25  |  1.0    Ca    9.4      24 May 2024 22:48    TPro  6.9  /  Alb  4.2  /  TBili  0.4  /  DBili  x   /  AST  17  /  ALT  10  /  AlkPhos  74  05-24    Hemoglobin A1C:   Vitamin B12     CAPILLARY BLOOD GLUCOSE          Urinalysis Basic - ( 24 May 2024 22:48 )    Color: x / Appearance: x / SG: x / pH: x  Gluc: 134 mg/dL / Ketone: x  / Bili: x / Urobili: x   Blood: x / Protein: x / Nitrite: x   Leuk Esterase: x / RBC: x / WBC x   Sq Epi: x / Non Sq Epi: x / Bacteria: x            Microbiology:      RADIOLOGY, EKG AND ADDITIONAL TESTS: Reviewed.           NEUROLOGY CONSULT    HPI: Patient is a 83 y/o former smoker M with PMH of CAD s/p 2 stents, A fib on eliquis who presents for the evaluation of episodes of feeling off balance. Patient states that on 05/12, he had accidently hit his head on the door and ended up with a small bruise and swelling. Patient denied any LOC and did not seek medical help. He then mentions that after few days he started experiencing feeling off balance specially provoked while getting up from seated position. He states that the episode happened every now and then and he had to hold on to something in order to prevent from falling. He c/o associated nausea x 1 but denied any headache, blurry vision, difficulty talking, focal numbness, weakness, recent change in appetite or sleep. Today, he states that he was working in front of computer for a long time and when he tried to get up from the chair, he again had similar episode of feeling off balance along with some nausea. Then at the end of the interview, patient that he might have been feeling off balance even before this head trauma episode. Neurology is consulted for dizziness evaluation.      MEDICATIONS  Home Medications:  famotidine 40 mg oral tablet: 1 tab(s) orally once a day (at bedtime) (25 Jul 2019 07:14)  finasteride 5 mg oral tablet: 1 tab(s) orally once a day (25 Jul 2019 07:14)  Flomax 0.4 mg oral capsule: 1 cap(s) orally once a day (25 Jul 2019 07:14)  Lipitor 20 mg oral tablet: 1 tab(s) orally once a day (25 Jul 2019 07:14)    MEDICATIONS  (STANDING):    MEDICATIONS  (PRN):      FAMILY HISTORY:  No pertinent family history in first degree relatives      SOCIAL HISTORY: negative for tobacco, alcohol, or ilicit drug use.    Allergies    penicillin (Rash)  Aleve (Hives)    Intolerances        GEN: NAD, pleasant, cooperative    NEURO:   MENTAL STATUS: AAOx3  LANG/SPEECH: Fluent, intact naming, repetition & comprehension  CRANIAL NERVES:  II: Pupils equal and reactive, no RAPD, normal visual field and fundus  III, IV, VI: EOM intact, no gaze preference or deviation  V: normal  VII: no facial asymmetry  VIII: normal hearing to speech  MOTOR: 5/5 in both upper and lower extremities  REFLEXES: 2/4 throughout, bilateral flexor plantars, negative clonus  SENSORY: Normal to touch, temperature & pin prick in all extremiteis  COORD: Normal finger to nose, no tremor, no dysmetria    Bedside Urinal noted to have maida blood.    LABS:                        15.4   7.99  )-----------( 178      ( 24 May 2024 22:48 )             46.5     05-24    140  |  103  |  19  ----------------------------<  134<H>  4.3   |  25  |  1.0    Ca    9.4      24 May 2024 22:48    TPro  6.9  /  Alb  4.2  /  TBili  0.4  /  DBili  x   /  AST  17  /  ALT  10  /  AlkPhos  74  05-24    Hemoglobin A1C:   Vitamin B12     CAPILLARY BLOOD GLUCOSE          Urinalysis Basic - ( 24 May 2024 22:48 )    Color: x / Appearance: x / SG: x / pH: x  Gluc: 134 mg/dL / Ketone: x  / Bili: x / Urobili: x   Blood: x / Protein: x / Nitrite: x   Leuk Esterase: x / RBC: x / WBC x   Sq Epi: x / Non Sq Epi: x / Bacteria: x            Microbiology:      RADIOLOGY, EKG AND ADDITIONAL TESTS: Reviewed.           NEUROLOGY CONSULT    HPI: Patient is a 83 y/o former smoker M with PMH of CAD s/p 2 stents, A fib on eliquis who presents for the evaluation of episodes of feeling off balance. Patient states that on 05/12, he had accidently hit his head on the door and ended up with a small bruise and swelling. Patient denied any LOC and did not seek medical help. He then mentions that after few days he started experiencing feeling off balance specially provoked while getting up from seated position. He states that the episode happened every now and then and he had to hold on to something in order to prevent from falling. He c/o associated nausea x 1 but denied any headache, blurry vision, difficulty talking, focal numbness, weakness, recent change in appetite or sleep. Today, he states that he was working in front of computer for a long time and when he tried to get up from the chair, he again had similar episode of feeling off balance along with some nausea. Then at the end of the interview, patient that he might have been feeling off balance even before this head trauma episode. Neurology is consulted for dizziness evaluation. Patient also denies lightheadedness, numbness, tingling or pins and needle sensation  in his feet.      MEDICATIONS  Home Medications:  famotidine 40 mg oral tablet: 1 tab(s) orally once a day (at bedtime) (25 Jul 2019 07:14)  finasteride 5 mg oral tablet: 1 tab(s) orally once a day (25 Jul 2019 07:14)  Flomax 0.4 mg oral capsule: 1 cap(s) orally once a day (25 Jul 2019 07:14)  Lipitor 20 mg oral tablet: 1 tab(s) orally once a day (25 Jul 2019 07:14)    MEDICATIONS  (STANDING):    MEDICATIONS  (PRN):      FAMILY HISTORY:  No pertinent family history in first degree relatives      SOCIAL HISTORY: negative for tobacco, alcohol, or ilicit drug use.    Allergies    penicillin (Rash)  Aleve (Hives)    Intolerances        GEN: NAD, pleasant, cooperative    NEURO:   MENTAL STATUS: AAOx3  LANG/SPEECH: Fluent, intact naming, repetition & comprehension  CRANIAL NERVES:  II: Pupils equal and reactive, no RAPD, normal visual field and fundus  III, IV, VI: EOM intact, no gaze preference or deviation  V: normal  VII: no facial asymmetry  VIII: normal hearing to speech  MOTOR: 5/5 in both upper and lower extremities  REFLEXES: 2/4 throughout, bilateral flexor plantars, negative clonus  SENSORY: Normal to touch, temperature & pin prick in all extremiteis  COORD: Normal finger to nose, no tremor, no dysmetria  Gait: narrow based, non ataxic       Bedside Urinal noted to have maida blood.    LABS:                        15.4   7.99  )-----------( 178      ( 24 May 2024 22:48 )             46.5     05-24    140  |  103  |  19  ----------------------------<  134<H>  4.3   |  25  |  1.0    Ca    9.4      24 May 2024 22:48    TPro  6.9  /  Alb  4.2  /  TBili  0.4  /  DBili  x   /  AST  17  /  ALT  10  /  AlkPhos  74  05-24    Hemoglobin A1C:   Vitamin B12     CAPILLARY BLOOD GLUCOSE          Urinalysis Basic - ( 24 May 2024 22:48 )    Color: x / Appearance: x / SG: x / pH: x  Gluc: 134 mg/dL / Ketone: x  / Bili: x / Urobili: x   Blood: x / Protein: x / Nitrite: x   Leuk Esterase: x / RBC: x / WBC x   Sq Epi: x / Non Sq Epi: x / Bacteria: x            Microbiology:      RADIOLOGY, EKG AND ADDITIONAL TESTS: Reviewed.           NEUROLOGY CONSULT    HPI: Patient is a 83 y/o former smoker M with PMH of CAD s/p 2 stents, A fib on eliquis who presents for the evaluation of episodes of feeling off balance. Patient states that on 05/12, he had accidently hit his head on the door and ended up with a small bruise and swelling. Patient denied any LOC and did not seek medical help. He then mentions that after few days he started experiencing feeling off balance specially provoked while getting up from seated position. He states that the episode happened every now and then and he had to hold on to something in order to prevent from falling. He c/o associated nausea x 1 but denied any headache, blurry vision, difficulty talking, focal numbness, weakness, recent change in appetite or sleep. Today, he states that he was working in front of computer for a long time and when he tried to get up from the chair, he again had similar episode of feeling off balance along with some nausea. Then at the end of the interview, patient that he might have been feeling off balance even before this head trauma episode. Neurology is consulted for dizziness evaluation. Patient also denies lightheadedness, numbness, tingling or pins and needle sensation  in his feet.      MEDICATIONS  Home Medications:  famotidine 40 mg oral tablet: 1 tab(s) orally once a day (at bedtime) (25 Jul 2019 07:14)  finasteride 5 mg oral tablet: 1 tab(s) orally once a day (25 Jul 2019 07:14)  Flomax 0.4 mg oral capsule: 1 cap(s) orally once a day (25 Jul 2019 07:14)  Lipitor 20 mg oral tablet: 1 tab(s) orally once a day (25 Jul 2019 07:14)    MEDICATIONS  (STANDING):    MEDICATIONS  (PRN):      FAMILY HISTORY:  No pertinent family history in first degree relatives      SOCIAL HISTORY: negative for tobacco, alcohol, or ilicit drug use.    Allergies    penicillin (Rash)  Aleve (Hives)    Intolerances        GEN: NAD, pleasant, cooperative    NEURO:   MENTAL STATUS: AAOx3  LANG/SPEECH: Fluent, intact naming, repetition & comprehension  CRANIAL NERVES:  II: Pupils equal and reactive, no RAPD, normal visual field and fundus  III, IV, VI: EOM intact, no gaze preference or deviation  V: normal  VII: no facial asymmetry  VIII: normal hearing to speech  MOTOR: 5/5 in both upper and lower extremities  REFLEXES: 2/4 throughout, bilateral flexor plantars, negative clonus  SENSORY: Normal to touch, temperature & pin prick in all extremities, vibration sensation intact, proprioception intact  COORD: Normal finger to nose, no tremor, no dysmetria  Gait: narrow based, non ataxic, able to walk tandem, on heels and tiptoes   Bedside Urinal noted to have maida blood.    LABS:                        15.4   7.99  )-----------( 178      ( 24 May 2024 22:48 )             46.5     05-24    140  |  103  |  19  ----------------------------<  134<H>  4.3   |  25  |  1.0    Ca    9.4      24 May 2024 22:48    TPro  6.9  /  Alb  4.2  /  TBili  0.4  /  DBili  x   /  AST  17  /  ALT  10  /  AlkPhos  74  05-24    Hemoglobin A1C:   Vitamin B12     CAPILLARY BLOOD GLUCOSE          Urinalysis Basic - ( 24 May 2024 22:48 )    Color: x / Appearance: x / SG: x / pH: x  Gluc: 134 mg/dL / Ketone: x  / Bili: x / Urobili: x   Blood: x / Protein: x / Nitrite: x   Leuk Esterase: x / RBC: x / WBC x   Sq Epi: x / Non Sq Epi: x / Bacteria: x            Microbiology:      RADIOLOGY, EKG AND ADDITIONAL TESTS: Reviewed.

## 2024-05-25 NOTE — CONSULT NOTE ADULT - ASSESSMENT
Patient is a 85 y/o former smoker M with PMH of CAD s/p 2 stents, A fib on eliquis who presents for the evaluation of episodes of feeling off balance. G Patient is a 85 y/o former smoker M with PMH of CAD s/p 2 stents, A fib on eliquis who presents for the evaluation of episodes of feeling off balance. Given normal exam, the neurological etiology for his balance issue is highly unlikely. However, we can still get some neuropathy labs and PT to help with gait. The maida hematuria needs further workup by the primary team or urology consult.    Recs:  - neuropathy labs: B12, B1, a1c, Vit E, Zinc, Copper, TSH, lyme ab, RPR, HIV, urine toxicology  - outpatient PT and neurology follow up   - hematuria work up per primary team or urology consult  - call for any change in neuro status Patient is a 83 y/o former smoker M with PMH of CAD s/p 2 stents, A fib on eliquis who presents for the evaluation of episodes of feeling off balance. Neuroimaging so far is within normal limit. Given normal exam, the neurological etiology for his balance issue is highly unlikely. However, we can still get some neuropathy labs and PT to help with balance. The maida hematuria needs further workup by the primary team or urology consult.    Recs:  - neuropathy labs: B12, B1, a1c, Vit E, Zinc, Copper, TSH, lyme ab, RPR, HIV, urine toxicology  - outpatient PT and neurology follow up   - hematuria work up per primary team or urology consult  - call for any change in neuro status Patient is a 85 y/o former smoker M with PMH of CAD s/p 2 stents, A fib on eliquis who presents for the evaluation of episodes of feeling off balance. Neuroimaging so far is within normal limit. Although Given normal exam, the neurological etiology for his balance issue is highly unlikely, post concussion syndrome is still in differential. We can still get some neuropathy labs and PT to help with balance, while following up with neurology outpatient. The maida hematuria needs further workup by the primary team or urology consult.    Recs:  - neuropathy labs: B12, B1, a1c, Vit E, Zinc, Copper, TSH, lyme ab, RPR, HIV, urine toxicology  - outpatient PT and neurology follow up   - hematuria work up per primary team or urology consult  - call for any change in neuro status

## 2024-05-25 NOTE — ED CDU PROVIDER INITIAL DAY NOTE - ATTENDING APP SHARED VISIT CONTRIBUTION OF CARE
84-year-old male past med history of CAD, A-fib on Eliquis presents with unsteadiness.  Patient reports that episodes have been intermittent for the last day.  No headache, no nausea vomiting,.  No numbness tingling or weakness.  Patient separately also having some hematuria.  Labs and imaging done.  No acute findings.  Incidental finding of pancreatic cyst discussed with patient and copy printed.  Patient seen by urology who is recommending outpatient management for his hematuria.  Patient seen by neurology who is recommending outpatient management.  Patient placed in ops for echo and further assessment.    CONSTITUTIONAL: Well-developed; well-nourished; in no acute distress.   SKIN: warm, dry  HEAD: Normocephalic; atraumatic.  EYES: PERRL, EOMI, no conjunctival erythema  ENT: No nasal discharge; airway clear.  NECK: Supple; non tender.  CARD: S1, S2 normal;  Regular rate and rhythm.   RESP: No wheezes, rales or rhonchi.  ABD: soft non tender, non distended, no rebound or guarding  EXT: Normal ROM.  5/5 strength in all 4 extremities   LYMPH: No acute cervical adenopathy.  NEURO: Alert, oriented, grossly unremarkable. neurovascularly intact  PSYCH: Cooperative, appropriate.

## 2024-05-25 NOTE — ED CDU PROVIDER DISPOSITION NOTE - CLINICAL COURSE
Patient presents to the emergency department with unsteadiness.  Also reporting some hematuria.  Labs and imaging done.  No acute findings.  Cardiac enzymes stable.  Incidental finding on CT abdomen of pancreatic cyst discussed and copy printed.  Patient placed in observation unit for further evaluation as well as echo. ECho done, no acute findings. Hematuria resolved while in the ED. All results discussed. Patient ambulating in the ED without assistance. Discharged with pmd, neurology and urology follow up. Return precautions discussed.

## 2024-05-25 NOTE — ED PROVIDER NOTE - DIFFERENTIAL DIAGNOSIS
Differential Diagnosis Electrolyte abnormalities, dehydration, MICKEY, hyperglycemia, hypoglycemia, symptomatic anemia.  r/o ICH, r/o cerebrovascular event, r/o cardiac arrhythmia.

## 2024-05-25 NOTE — ED PROVIDER NOTE - CLINICAL SUMMARY MEDICAL DECISION MAKING FREE TEXT BOX
Patient remained stable in ED, labs/CXR/EKGCT findings reviewed and discussed with patient. Discussed with EDOU/OBS provider, patient is admitted to EDOU for further evaluation of her symptoms.

## 2024-05-25 NOTE — ED ADULT NURSE REASSESSMENT NOTE - NS ED NURSE REASSESS COMMENT FT1
Pt reassessed A/O times 4 Vs stable on cardiac monitor denies chest pain denies SOB no n/V no dizziness at bed site with  family members ambulate steady with 1 person stand by ,pt is seen evaluate BY ED attending clear to go home NAD noted left on the with [facility].

## 2024-05-25 NOTE — CONSULT NOTE ADULT - SUBJECTIVE AND OBJECTIVE BOX
UROLOGY CONSULT: Pt is an 85y/o M presenting for dizziness. Urology consulted by neurology for hematuria noted in urinal/UA. Pt denies any issues voiding at this time; denies dysuria, frequency/urgency, sensation of incomplete bladder emptying.     PAST MEDICAL & SURGICAL HISTORY:  GERD (gastroesophageal reflux disease)  Hyperlipidemia    MEDICATIONS  (STANDING):  atorvastatin 20 milliGRAM(s) Oral at bedtime  famotidine    Tablet 40 milliGRAM(s) Oral daily  tamsulosin 0.4 milliGRAM(s) Oral at bedtime    Allergies  penicillin (Rash)  Aleve (Hives)    FAMILY HISTORY:  No pertinent family history in first degree relatives    REVIEW OF SYSTEMS   [x] A ten-point review of systems was otherwise negative except as noted.    Vital Signs Last 24 Hrs  T(C): 36.4 (24 May 2024 20:51), Max: 36.4 (24 May 2024 20:51)  T(F): 97.6 (24 May 2024 20:51), Max: 97.6 (24 May 2024 20:51)  HR: 65 (24 May 2024 20:51) (65 - 65)  BP: 134/65 (24 May 2024 20:51) (134/65 - 134/65)  RR: 16 (24 May 2024 20:51) (16 - 16)  SpO2: 99% (24 May 2024 20:51) (99% - 99%)    PHYSICAL EXAM:  GEN: NAD, awake and alert.  SKIN: Good color, non diaphoretic.  RESP: Non-labored breathing. No use of accessory muscles.  CARDIO: +S1/S2  ABDO: Soft, NT/ND, no palpable bladder, no suprapubic tenderness  BACK: No CVAT B/L  : voiding freely.  EXT: ACUÑA x 4    I&O's Summary      LABS:             15.4   7.99  )-----------( 178      ( 24 May 2024 22:48 )             46.5     140  |  103  |  19  ----------------------------<  134<H>  4.3   |  25  |  1.0    Ca    9.4      24 May 2024 22:48  TPro  6.9  /  Alb  4.2  /  TBili  0.4  /  DBili  x   /  AST  17  /  ALT  10  /  AlkPhos  74  05-24    Urinalysis Basic - ( 25 May 2024 02:40 )  Color: Orange / Appearance: Clear / SG: >1.030 / pH: x  Gluc: x / Ketone: Trace mg/dL  / Bili: Negative / Urobili: 0.2 mg/dL   Blood: x / Protein: Trace mg/dL / Nitrite: Negative   Leuk Esterase: Negative / RBC: 785 /HPF / WBC 1 /HPF   Sq Epi: x / Non Sq Epi: 0 /HPF / Bacteria: Negative /HPF    RADIOLOGY & ADDITIONAL STUDIES:  ACC: [id number] EXAM:  CT ABDOMEN AND PELVIS   ORDERED BY: LUC ANDERS     PROCEDURE DATE:  05/25/2024    ******PRELIMINARY REPORT******      ******PRELIMINARY REPORT******     INTERPRETATION:  CLINICAL STATEMENT: Hematuria  TECHNIQUE: Contiguous axial CT images were obtained from the lower chest   to the pubic symphysis without intravenous contrast. Oral contrast was   not administered.  Reformatted images in the coronal and sagittal planes   were acquired.  COMPARISON CT: CT abdomen pelvis 1/29/2011    FINDINGS:  LOWER CHEST: Bibasilar dependent atelectasis.  HEPATOBILIARY: Post cholecystectomy. No biliary ductal dilatation.  SPLEEN: Unremarkable.  PANCREAS: Unremarkable.  ADRENAL GLANDS: Unremarkable.  KIDNEYS: No hydronephrosis. Contrast material seen within the bilateral   collecting systems limiting evaluation for nephrolithiasis. Left renal   cyst.  ABDOMINOPELVIC NODES: Unremarkable.  PELVIC ORGANS: Contrast seen within the urinary bladder. Prostatomegaly   with median lobe hypertrophy.  PERITONEUM/MESENTERY/BOWEL: No bowel obstruction, pneumoperitoneum, or   ascites. Normal caliber appendix. Colonic diverticulosis.  BONES/SOFT TISSUES: Degenerative changes.  OTHER: Vascular calcifications.    IMPRESSION:  No evidence of acute abdominal pathology.    DANIELLE SHAH MD; Resident Radiologist  This document is a PRELIMINARY interpretation and is pending final   attending approval. May 25 2024  2:47AM

## 2024-05-25 NOTE — CONSULT NOTE ADULT - ASSESSMENT
85y/o M on Elquis with hematuria     - No acute urologic intervention at this time   - Blood thinner as per Neurology if risks outweigh benefits   - Monitor H&H as outpatient  - Return precautions if sensation of inability to urinate  - Hematuria workup to be completed as outpatient with Dr. Ackerman including Urine cytology, CT Urogram, and +/- cystoscopy to r/o any  malignancies .

## 2024-05-25 NOTE — ED CDU PROVIDER INITIAL DAY NOTE - PROGRESS NOTE DETAILS
Patent comfortable, awaiting echo. Advised of pancreatic finding seen on CT scan and need for outpatient f/u. Copr of report given to patient.

## 2024-05-25 NOTE — ED PROVIDER NOTE - PROGRESS NOTE DETAILS
ck: patient endorsed to me by jean-pierre moore pending neuro eval. patient endorsed episode of hematuria, reports hx bph. denies f/c, abd pain, bowel changes, chest pain, back pain.

## 2024-05-25 NOTE — ED CDU PROVIDER DISPOSITION NOTE - WET READ LAUNCH FT
He is appropriately out. I have sent him a bridge script to the next appt    There are no Wet Read(s) to document.

## 2024-05-25 NOTE — ED CDU PROVIDER INITIAL DAY NOTE - PHYSICAL EXAMINATION
CONSTITUTIONAL: non-toxic appearing male, nad  SKIN: skin exam is warm and dry  HEAD: Normocephalic; atraumatic  NECK: ROM intact.  CARD: S1, S2 normal, no murmur  RESP: No wheezes, rales or rhonchi. Good air movement bilaterally  ABD: soft; non-distended; non-tender.    EXT: Normal ROM.    NEURO: awake, alert, following commands, oriented, grossly unremarkable. No Focal deficits. GCS 15.   PSYCH: Cooperative, appropriate.

## 2024-06-07 ENCOUNTER — APPOINTMENT (OUTPATIENT)
Dept: PULMONOLOGY | Facility: CLINIC | Age: 84
End: 2024-06-07
Payer: MEDICARE

## 2024-06-07 VITALS
WEIGHT: 160 LBS | HEART RATE: 93 BPM | DIASTOLIC BLOOD PRESSURE: 62 MMHG | SYSTOLIC BLOOD PRESSURE: 110 MMHG | RESPIRATION RATE: 14 BRPM | HEIGHT: 67 IN | OXYGEN SATURATION: 98 % | BODY MASS INDEX: 25.11 KG/M2

## 2024-06-07 DIAGNOSIS — R05.3 CHRONIC COUGH: ICD-10-CM

## 2024-06-07 DIAGNOSIS — Z87.19 PERSONAL HISTORY OF OTHER DISEASES OF THE DIGESTIVE SYSTEM: ICD-10-CM

## 2024-06-07 DIAGNOSIS — J98.4 OTHER DISORDERS OF LUNG: ICD-10-CM

## 2024-06-07 DIAGNOSIS — R91.1 SOLITARY PULMONARY NODULE: ICD-10-CM

## 2024-06-07 DIAGNOSIS — Z87.438 PERSONAL HISTORY OF OTHER DISEASES OF MALE GENITAL ORGANS: ICD-10-CM

## 2024-06-07 DIAGNOSIS — R09.82 POSTNASAL DRIP: ICD-10-CM

## 2024-06-07 DIAGNOSIS — Z87.891 PERSONAL HISTORY OF NICOTINE DEPENDENCE: ICD-10-CM

## 2024-06-07 DIAGNOSIS — R93.89 ABNORMAL FINDINGS ON DIAGNOSTIC IMAGING OF OTHER SPECIFIED BODY STRUCTURES: ICD-10-CM

## 2024-06-07 PROCEDURE — 99204 OFFICE O/P NEW MOD 45 MIN: CPT

## 2024-06-07 RX ORDER — APIXABAN 5 MG/1
TABLET, FILM COATED ORAL
Refills: 0 | Status: ACTIVE | COMMUNITY

## 2024-06-07 RX ORDER — FINASTERIDE 1 MG/1
TABLET ORAL
Refills: 0 | Status: ACTIVE | COMMUNITY

## 2024-06-07 RX ORDER — TAMSULOSIN HCL 0.4 MG
CAPSULE ORAL
Refills: 0 | Status: ACTIVE | COMMUNITY

## 2024-06-07 RX ORDER — FAMOTIDINE 40 MG/1
TABLET, FILM COATED ORAL
Refills: 0 | Status: ACTIVE | COMMUNITY

## 2024-06-07 RX ORDER — AZELASTINE HYDROCHLORIDE 137 UG/1
0.1 SPRAY, METERED NASAL
Qty: 1 | Refills: 3 | Status: ACTIVE | COMMUNITY
Start: 2024-06-07 | End: 1900-01-01

## 2024-06-07 NOTE — REVIEW OF SYSTEMS
[Nasal Congestion] : nasal congestion [Postnasal Drip] : postnasal drip [Cough] : cough [GERD] : gerd [Nocturia] : nocturia [Negative] : Endocrine

## 2024-06-07 NOTE — HISTORY OF PRESENT ILLNESS
[TextBox_4] : This is a an 84-year-old male presented for evaluation of abnormal chest x-ray.  Patient has history of GERD complaining of chronic cough he described the cough is more at night early morning when he wake up he started to cough dry in nature and then he spit a little bit of mucus clear in color and then he would be doing good the whole day, denies fever shortness of breath or wheeze.  On 5/24/20 24 he was not feeling good tired fatigue diarrhea went to the ER had chest x-ray and CT abdomen chest x-ray showed that peripheral right midlung opacity possible superimposed post density also CT of abdomen showed that he has pancreatic cyst.  And patient was referred to pulmonologist and gastroenterologist Currently he feels good no fever weight loss chest pain or shortness of breath he still have the same mild intermittent cough in the morning on and off Also he do complain of runny nose and postnasal drip cxr reviewed

## 2024-06-07 NOTE — PLAN
[TextEntry] : will proceed with ct scan told wife to call me after he do the ct scan  follow GI Dr Rose for the pancreatic cyst  PFT  will start azelastine at night for postnasal drip  already on famotidine

## 2024-06-28 ENCOUNTER — OUTPATIENT (OUTPATIENT)
Dept: OUTPATIENT SERVICES | Facility: HOSPITAL | Age: 84
LOS: 1 days | End: 2024-06-28
Payer: MEDICARE

## 2024-06-28 ENCOUNTER — APPOINTMENT (OUTPATIENT)
Dept: PULMONOLOGY | Facility: HOSPITAL | Age: 84
End: 2024-06-28
Payer: MEDICARE

## 2024-06-28 DIAGNOSIS — R06.02 SHORTNESS OF BREATH: ICD-10-CM

## 2024-06-28 PROCEDURE — 94727 GAS DIL/WSHOT DETER LNG VOL: CPT | Mod: 26

## 2024-06-28 PROCEDURE — 94726 PLETHYSMOGRAPHY LUNG VOLUMES: CPT

## 2024-06-28 PROCEDURE — 94729 DIFFUSING CAPACITY: CPT

## 2024-06-28 PROCEDURE — 94060 EVALUATION OF WHEEZING: CPT | Mod: 26

## 2024-06-28 PROCEDURE — 94070 EVALUATION OF WHEEZING: CPT

## 2024-06-28 PROCEDURE — 94664 DEMO&/EVAL PT USE INHALER: CPT

## 2024-06-28 PROCEDURE — 94729 DIFFUSING CAPACITY: CPT | Mod: 26

## 2024-06-29 DIAGNOSIS — R06.02 SHORTNESS OF BREATH: ICD-10-CM

## 2024-07-16 ENCOUNTER — RESULT REVIEW (OUTPATIENT)
Age: 84
End: 2024-07-16

## 2024-07-16 ENCOUNTER — OUTPATIENT (OUTPATIENT)
Dept: OUTPATIENT SERVICES | Facility: HOSPITAL | Age: 84
LOS: 1 days | End: 2024-07-16
Payer: MEDICARE

## 2024-07-16 DIAGNOSIS — J98.4 OTHER DISORDERS OF LUNG: ICD-10-CM

## 2024-07-16 DIAGNOSIS — Z00.8 ENCOUNTER FOR OTHER GENERAL EXAMINATION: ICD-10-CM

## 2024-07-16 PROCEDURE — 71250 CT THORAX DX C-: CPT | Mod: 26

## 2024-07-16 PROCEDURE — 71250 CT THORAX DX C-: CPT

## 2024-07-17 DIAGNOSIS — J98.4 OTHER DISORDERS OF LUNG: ICD-10-CM

## 2024-07-19 ENCOUNTER — NON-APPOINTMENT (OUTPATIENT)
Age: 84
End: 2024-07-19

## 2024-11-02 ENCOUNTER — EMERGENCY (EMERGENCY)
Facility: HOSPITAL | Age: 84
LOS: 0 days | Discharge: HOME | End: 2024-11-02
Attending: EMERGENCY MEDICINE
Payer: MEDICARE

## 2024-11-02 VITALS
TEMPERATURE: 98 F | WEIGHT: 149.91 LBS | HEART RATE: 88 BPM | OXYGEN SATURATION: 97 % | RESPIRATION RATE: 18 BRPM | HEIGHT: 66 IN | SYSTOLIC BLOOD PRESSURE: 164 MMHG | DIASTOLIC BLOOD PRESSURE: 101 MMHG

## 2024-11-02 DIAGNOSIS — Z23 ENCOUNTER FOR IMMUNIZATION: ICD-10-CM
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
## 2024-11-02 DIAGNOSIS — I25.10 ATHEROSCLEROTIC HEART DISEASE OF NATIVE CORONARY ARTERY WITHOUT ANGINA PECTORIS: ICD-10-CM

## 2024-11-02 DIAGNOSIS — Z79.01 LONG TERM (CURRENT) USE OF ANTICOAGULANTS: ICD-10-CM
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
## 2024-11-02 DIAGNOSIS — K21.9 GASTRO-ESOPHAGEAL REFLUX DISEASE WITHOUT ESOPHAGITIS: ICD-10-CM

## 2024-11-02 DIAGNOSIS — Z88.0 ALLERGY STATUS TO PENICILLIN: ICD-10-CM

## 2024-11-02 DIAGNOSIS — S09.90XA UNSPECIFIED INJURY OF HEAD, INITIAL ENCOUNTER: ICD-10-CM

## 2024-11-02 DIAGNOSIS — E78.5 HYPERLIPIDEMIA, UNSPECIFIED: ICD-10-CM

## 2024-11-02 DIAGNOSIS — Z95.5 PRESENCE OF CORONARY ANGIOPLASTY IMPLANT AND GRAFT: ICD-10-CM

## 2024-11-02 DIAGNOSIS — S01.01XA LACERATION WITHOUT FOREIGN BODY OF SCALP, INITIAL ENCOUNTER: ICD-10-CM
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
## 2024-11-02 DIAGNOSIS — Z88.6 ALLERGY STATUS TO ANALGESIC AGENT: ICD-10-CM

## 2024-11-02 LAB
ALBUMIN SERPL ELPH-MCNC: 4.2 G/DL — SIGNIFICANT CHANGE UP (ref 3.5–5.2)
ALP SERPL-CCNC: 78 U/L — SIGNIFICANT CHANGE UP (ref 30–115)
ALT FLD-CCNC: 11 U/L — SIGNIFICANT CHANGE UP (ref 0–41)
ANION GAP SERPL CALC-SCNC: 9 MMOL/L — SIGNIFICANT CHANGE UP (ref 7–14)
APTT BLD: 32.5 SEC — SIGNIFICANT CHANGE UP (ref 27–39.2)
AST SERPL-CCNC: 21 U/L — SIGNIFICANT CHANGE UP (ref 0–41)
BASOPHILS # BLD AUTO: 0.04 K/UL — SIGNIFICANT CHANGE UP (ref 0–0.2)
BASOPHILS NFR BLD AUTO: 0.7 % — SIGNIFICANT CHANGE UP (ref 0–1)
BILIRUB SERPL-MCNC: 0.5 MG/DL — SIGNIFICANT CHANGE UP (ref 0.2–1.2)
BUN SERPL-MCNC: 16 MG/DL — SIGNIFICANT CHANGE UP (ref 10–20)
CALCIUM SERPL-MCNC: 9.3 MG/DL — SIGNIFICANT CHANGE UP (ref 8.4–10.4)
CHLORIDE SERPL-SCNC: 102 MMOL/L — SIGNIFICANT CHANGE UP (ref 98–110)
CO2 SERPL-SCNC: 26 MMOL/L — SIGNIFICANT CHANGE UP (ref 17–32)
CREAT SERPL-MCNC: 1 MG/DL — SIGNIFICANT CHANGE UP (ref 0.7–1.5)
EGFR: 74 ML/MIN/1.73M2 — SIGNIFICANT CHANGE UP
EGFR: 74 ML/MIN/1.73M2 — SIGNIFICANT CHANGE UP
EOSINOPHIL # BLD AUTO: 0.13 K/UL — SIGNIFICANT CHANGE UP (ref 0–0.7)
EOSINOPHIL NFR BLD AUTO: 2.1 % — SIGNIFICANT CHANGE UP (ref 0–8)
ETHANOL SERPL-MCNC: <10 MG/DL — SIGNIFICANT CHANGE UP
GLUCOSE SERPL-MCNC: 98 MG/DL — SIGNIFICANT CHANGE UP (ref 70–99)
HCT VFR BLD CALC: 45.3 % — SIGNIFICANT CHANGE UP (ref 42–52)
HGB BLD-MCNC: 15.3 G/DL — SIGNIFICANT CHANGE UP (ref 14–18)
IMM GRANULOCYTES NFR BLD AUTO: 0.3 % — SIGNIFICANT CHANGE UP (ref 0.1–0.3)
INR BLD: 1.51 RATIO — HIGH (ref 0.65–1.3)
LACTATE SERPL-SCNC: 1.4 MMOL/L — SIGNIFICANT CHANGE UP (ref 0.7–2)
LIDOCAIN IGE QN: 45 U/L — SIGNIFICANT CHANGE UP (ref 7–60)
LYMPHOCYTES # BLD AUTO: 1.39 K/UL — SIGNIFICANT CHANGE UP (ref 1.2–3.4)
LYMPHOCYTES # BLD AUTO: 22.8 % — SIGNIFICANT CHANGE UP (ref 20.5–51.1)
MCHC RBC-ENTMCNC: 32 PG — HIGH (ref 27–31)
MCHC RBC-ENTMCNC: 33.8 G/DL — SIGNIFICANT CHANGE UP (ref 32–37)
MCV RBC AUTO: 94.8 FL — HIGH (ref 80–94)
MONOCYTES # BLD AUTO: 0.41 K/UL — SIGNIFICANT CHANGE UP (ref 0.1–0.6)
MONOCYTES NFR BLD AUTO: 6.7 % — SIGNIFICANT CHANGE UP (ref 1.7–9.3)
NEUTROPHILS # BLD AUTO: 4.11 K/UL — SIGNIFICANT CHANGE UP (ref 1.4–6.5)
NEUTROPHILS NFR BLD AUTO: 67.4 % — SIGNIFICANT CHANGE UP (ref 42.2–75.2)
NRBC # BLD: 0 /100 WBCS — SIGNIFICANT CHANGE UP (ref 0–0)
NRBC BLD-RTO: 0 /100 WBCS — SIGNIFICANT CHANGE UP (ref 0–0)
PLATELET # BLD AUTO: 178 K/UL — SIGNIFICANT CHANGE UP (ref 130–400)
PMV BLD: 10.8 FL — HIGH (ref 7.4–10.4)
POTASSIUM SERPL-MCNC: 4.5 MMOL/L — SIGNIFICANT CHANGE UP (ref 3.5–5)
POTASSIUM SERPL-SCNC: 4.5 MMOL/L — SIGNIFICANT CHANGE UP (ref 3.5–5)
PROT SERPL-MCNC: 7 G/DL — SIGNIFICANT CHANGE UP (ref 6–8)
PROTHROM AB SERPL-ACNC: 18 SEC — HIGH (ref 9.95–12.87)
RBC # BLD: 4.78 M/UL — SIGNIFICANT CHANGE UP (ref 4.7–6.1)
RBC # FLD: 13.2 % — SIGNIFICANT CHANGE UP (ref 11.5–14.5)
SODIUM SERPL-SCNC: 137 MMOL/L — SIGNIFICANT CHANGE UP (ref 135–146)
WBC # BLD: 6.1 K/UL — SIGNIFICANT CHANGE UP (ref 4.8–10.8)
WBC # FLD AUTO: 6.1 K/UL — SIGNIFICANT CHANGE UP (ref 4.8–10.8)

## 2024-11-02 PROCEDURE — 90471 IMMUNIZATION ADMIN: CPT

## 2024-11-02 PROCEDURE — 72170 X-RAY EXAM OF PELVIS: CPT | Mod: 26

## 2024-11-02 PROCEDURE — 72125 CT NECK SPINE W/O DYE: CPT | Mod: 26,MC

## 2024-11-02 PROCEDURE — 72125 CT NECK SPINE W/O DYE: CPT | Mod: MC

## 2024-11-02 PROCEDURE — 36415 COLL VENOUS BLD VENIPUNCTURE: CPT

## 2024-11-02 PROCEDURE — 71045 X-RAY EXAM CHEST 1 VIEW: CPT | Mod: 26

## 2024-11-02 PROCEDURE — 83605 ASSAY OF LACTIC ACID: CPT

## 2024-11-02 PROCEDURE — 85730 THROMBOPLASTIN TIME PARTIAL: CPT

## 2024-11-02 PROCEDURE — 71045 X-RAY EXAM CHEST 1 VIEW: CPT

## 2024-11-02 PROCEDURE — 82962 GLUCOSE BLOOD TEST: CPT

## 2024-11-02 PROCEDURE — 70450 CT HEAD/BRAIN W/O DYE: CPT | Mod: 26,MC

## 2024-11-02 PROCEDURE — 99284 EMERGENCY DEPT VISIT MOD MDM: CPT

## 2024-11-02 PROCEDURE — 80053 COMPREHEN METABOLIC PANEL: CPT

## 2024-11-02 PROCEDURE — 80307 DRUG TEST PRSMV CHEM ANLYZR: CPT

## 2024-11-02 PROCEDURE — 90715 TDAP VACCINE 7 YRS/> IM: CPT

## 2024-11-02 PROCEDURE — 99291 CRITICAL CARE FIRST HOUR: CPT

## 2024-11-02 PROCEDURE — 85610 PROTHROMBIN TIME: CPT

## 2024-11-02 PROCEDURE — 83690 ASSAY OF LIPASE: CPT

## 2024-11-02 PROCEDURE — 72170 X-RAY EXAM OF PELVIS: CPT

## 2024-11-02 PROCEDURE — 70450 CT HEAD/BRAIN W/O DYE: CPT | Mod: MC

## 2024-11-02 PROCEDURE — 99291 CRITICAL CARE FIRST HOUR: CPT | Mod: 25

## 2024-11-02 PROCEDURE — 85025 COMPLETE CBC W/AUTO DIFF WBC: CPT

## 2024-11-02 RX ADMIN — Medication 250 MILLILITER(S): at 18:26

## 2024-11-18 ENCOUNTER — APPOINTMENT (OUTPATIENT)
Dept: PULMONOLOGY | Facility: CLINIC | Age: 84
End: 2024-11-18

## 2025-02-11 ENCOUNTER — APPOINTMENT (OUTPATIENT)
Dept: PULMONOLOGY | Facility: CLINIC | Age: 85
End: 2025-02-11
Payer: MEDICARE

## 2025-02-11 VITALS
WEIGHT: 154 LBS | DIASTOLIC BLOOD PRESSURE: 58 MMHG | SYSTOLIC BLOOD PRESSURE: 110 MMHG | HEART RATE: 78 BPM | OXYGEN SATURATION: 95 % | BODY MASS INDEX: 24.12 KG/M2

## 2025-02-11 DIAGNOSIS — R05.3 CHRONIC COUGH: ICD-10-CM

## 2025-02-11 DIAGNOSIS — R09.82 POSTNASAL DRIP: ICD-10-CM

## 2025-02-11 PROCEDURE — G2211 COMPLEX E/M VISIT ADD ON: CPT

## 2025-02-11 PROCEDURE — 99213 OFFICE O/P EST LOW 20 MIN: CPT

## 2025-06-20 ENCOUNTER — APPOINTMENT (OUTPATIENT)
Dept: PULMONOLOGY | Facility: CLINIC | Age: 85
End: 2025-06-20

## 2025-06-25 ENCOUNTER — OUTPATIENT (OUTPATIENT)
Dept: OUTPATIENT SERVICES | Facility: HOSPITAL | Age: 85
LOS: 1 days | Discharge: HOME | End: 2025-06-25
Payer: MEDICARE

## 2025-06-25 ENCOUNTER — RESULT REVIEW (OUTPATIENT)
Age: 85
End: 2025-06-25

## 2025-06-25 DIAGNOSIS — I25.10 ATHEROSCLEROTIC HEART DISEASE OF NATIVE CORONARY ARTERY WITHOUT ANGINA PECTORIS: ICD-10-CM

## 2025-06-25 PROCEDURE — 93320 DOPPLER ECHO COMPLETE: CPT

## 2025-06-25 PROCEDURE — 93312 ECHO TRANSESOPHAGEAL: CPT

## 2025-06-25 PROCEDURE — 93325 DOPPLER ECHO COLOR FLOW MAPG: CPT

## 2025-06-25 NOTE — PRE-ANESTHESIA EVALUATION ADULT - NSATTENDATTESTRD_GEN_ALL_CORE
The patient has been re-examined and I agree with the above assessment or I updated with my findings. standing/walking

## 2025-06-25 NOTE — CHART NOTE - NSCHARTNOTEFT_GEN_A_CORE
POST OPERATIVE PROCEDURAL DOCUMENTATION  PRE-OP DIAGNOSIS:  AFlutter       POST-OP DIAGNOSIS:  AFlutter  s/p successful CV    PROCEDURE: Transesophageal echocardiogram    Primary Physician:  Dr. Botello   Assistant: Dr. Ray     ANESTHESIA TYPE  [  ] General Anesthesia  [ x ] Conscious Sedation  [  ] Local/Regional    CONDITION  [  ] Critical  [  ] Serious  [  ] Fair  [ x ] Good    SPECIMENS REMOVED (IF APPLICABLE): N/A    IMPLANTS (IF APPLICABLE): None    ESTIMATED BLOOD LOSS: None    COMPLICATIONS: None      FINDINGS:    After risks and benefits of procedures were explained, informed consent was obtained and placed in chart. Refer to Anesthesia note for sedation details.  The RENE probe was passed into the esophagus without difficulty.  Transesophageal  images were obtained.  The RENE probe was removed without difficulty and examined.  There was no evidence for bleeding.  The patient tolerated the procedure well without any immediate RENE-related complications.      Preliminary Findings:  LA:   Severely enlarged   JUNAID: Left atrial appendage was clear of clot and smoke. Low contractile velocities   MV: mild-mod MR, no evidence of MS.   AV: Mild AI, no evidence of AS.   RA: Mildly enlarged   TV: Mild  TR.   PV: Trace PI.   IAS: no PFO. No R-> L shunt by color doppler  Aorta:  simple atheroma of aortic arch / desc aorta    DIAGNOSIS/IMPRESSION:    Patient successfully converted to sinus rhythm with synchronized  200J of direct current cardioversion.    PLAN OF CARE:  c/w Anticoagulation   f/u with cardiologist
PACU ANESTHESIA ADMISSION NOTE      Procedure: luan/dccv  Post op diagnosis:  atrial fibrillation    ____  Intubated  TV:______       Rate: ______      FiO2: ______    __x__  Patent Airway    __x__  Full return of protective reflexes    __x__  Full recovery from anesthesia / back to baseline     Vitals:   T:  97         R: 16                 BP:  107/69                Sat: 98                  P: 85      Mental Status:  _x___ Awake   __x___ Alert   _____ Drowsy   _____ Sedated    Nausea/Vomiting:  _x___ NO  ______Yes,   See Post - Op Orders          Pain Scale (0-10):  __0___    Treatment: ____ None    ___x_ See Post - Op/PCA Orders    Post - Operative Fluids:   ____ Oral   __x__ See Post - Op Orders    Plan: Discharge:   __x__Home       _____Floor     _____Critical Care    _____  Other:_________________    Comments:

## 2025-07-02 DIAGNOSIS — I48.92 UNSPECIFIED ATRIAL FLUTTER: ICD-10-CM
